# Patient Record
Sex: FEMALE | Race: WHITE
[De-identification: names, ages, dates, MRNs, and addresses within clinical notes are randomized per-mention and may not be internally consistent; named-entity substitution may affect disease eponyms.]

---

## 2019-11-04 ENCOUNTER — HOSPITAL ENCOUNTER (OUTPATIENT)
Dept: HOSPITAL 19 - SDCO | Age: 54
Discharge: HOME | End: 2019-11-04
Attending: INTERNAL MEDICINE
Payer: OTHER GOVERNMENT

## 2019-11-04 VITALS — TEMPERATURE: 97.5 F | HEART RATE: 63 BPM | DIASTOLIC BLOOD PRESSURE: 68 MMHG | SYSTOLIC BLOOD PRESSURE: 123 MMHG

## 2019-11-04 VITALS — DIASTOLIC BLOOD PRESSURE: 78 MMHG | HEART RATE: 74 BPM | SYSTOLIC BLOOD PRESSURE: 113 MMHG

## 2019-11-04 VITALS — SYSTOLIC BLOOD PRESSURE: 123 MMHG | DIASTOLIC BLOOD PRESSURE: 68 MMHG | HEART RATE: 62 BPM

## 2019-11-04 VITALS — HEART RATE: 60 BPM | TEMPERATURE: 97.8 F | SYSTOLIC BLOOD PRESSURE: 132 MMHG | DIASTOLIC BLOOD PRESSURE: 72 MMHG

## 2019-11-04 VITALS — BODY MASS INDEX: 22.82 KG/M2 | WEIGHT: 141.98 LBS | HEIGHT: 65.98 IN

## 2019-11-04 VITALS — DIASTOLIC BLOOD PRESSURE: 71 MMHG | SYSTOLIC BLOOD PRESSURE: 109 MMHG | HEART RATE: 61 BPM

## 2019-11-04 DIAGNOSIS — Z90.710: ICD-10-CM

## 2019-11-04 DIAGNOSIS — Z79.899: ICD-10-CM

## 2019-11-04 DIAGNOSIS — Z86.010: ICD-10-CM

## 2019-11-04 DIAGNOSIS — K63.5: ICD-10-CM

## 2019-11-04 DIAGNOSIS — K62.89: ICD-10-CM

## 2019-11-04 DIAGNOSIS — I10: ICD-10-CM

## 2019-11-04 DIAGNOSIS — Z86.008: ICD-10-CM

## 2019-11-04 DIAGNOSIS — Z12.11: Primary | ICD-10-CM

## 2019-11-04 NOTE — NUR
Patient tolerating food and drink without difficulty.  Denies pain or nausea.
Vitals stable, will continue to monitor.

## 2019-11-04 NOTE — NUR
Patient states she is feeling well and ready to go home.  Vital signs stable.
Ambulated to bathroom without difficulty.  IV removed.  Patient to get
dressed at this time.  Will continue to monitor.

## 2019-11-04 NOTE — NUR
Patient returned to [facility] via cart, ambulated to chair without difficulty.
Placed on monitors, vital signs stable.  Report recieved from [facility].
Denies any pain or nausea.   [employer]t at bedside.  Requesting soda, toast,
and pudding.  Warm blanket provided.  Call bell within reach, will continue to
monitor.

## 2019-11-04 NOTE — NUR
Discharge instructions reviewed with patient and , verbalized
understanding.  Vital signs stable.  Patient to get dressed at this time.